# Patient Record
Sex: MALE | Race: BLACK OR AFRICAN AMERICAN
[De-identification: names, ages, dates, MRNs, and addresses within clinical notes are randomized per-mention and may not be internally consistent; named-entity substitution may affect disease eponyms.]

---

## 2020-02-15 ENCOUNTER — HOSPITAL ENCOUNTER (EMERGENCY)
Dept: HOSPITAL 61 - ER | Age: 70
LOS: 1 days | Discharge: HOME | End: 2020-02-16
Payer: OTHER GOVERNMENT

## 2020-02-15 VITALS — DIASTOLIC BLOOD PRESSURE: 87 MMHG | SYSTOLIC BLOOD PRESSURE: 152 MMHG

## 2020-02-15 VITALS — WEIGHT: 119.27 LBS | BODY MASS INDEX: 18.08 KG/M2 | HEIGHT: 68 IN

## 2020-02-15 DIAGNOSIS — E11.9: ICD-10-CM

## 2020-02-15 DIAGNOSIS — I10: ICD-10-CM
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
## 2020-02-15 DIAGNOSIS — F10.229: Primary | ICD-10-CM

## 2020-02-15 LAB
ALBUMIN SERPL-MCNC: 3.7 G/DL (ref 3.4–5)
ALBUMIN/GLOB SERPL: 0.8 {RATIO} (ref 1–1.7)
ALP SERPL-CCNC: 100 U/L (ref 46–116)
ALT SERPL-CCNC: 17 U/L (ref 16–63)
AMPHETAMINE/METHAMPHETAMINE: (no result)
ANION GAP SERPL CALC-SCNC: 14 MMOL/L (ref 6–14)
APTT PPP: YELLOW S
AST SERPL-CCNC: 27 U/L (ref 15–37)
BACTERIA #/AREA URNS HPF: 0 /HPF
BARBITURATES UR-MCNC: (no result) UG/ML
BASOPHILS # BLD AUTO: 0 X10^3/UL (ref 0–0.2)
BASOPHILS NFR BLD: 1 % (ref 0–3)
BENZODIAZ UR-MCNC: (no result) UG/L
BILIRUB SERPL-MCNC: 0.2 MG/DL (ref 0.2–1)
BILIRUB UR QL STRIP: NEGATIVE
BUN SERPL-MCNC: 8 MG/DL (ref 8–26)
BUN/CREAT SERPL: 10 (ref 6–20)
CALCIUM SERPL-MCNC: 9 MG/DL (ref 8.5–10.1)
CANNABINOIDS UR-MCNC: (no result) UG/L
CHLORIDE SERPL-SCNC: 102 MMOL/L (ref 98–107)
CO2 SERPL-SCNC: 24 MMOL/L (ref 21–32)
COCAINE UR-MCNC: (no result) NG/ML
CREAT SERPL-MCNC: 0.8 MG/DL (ref 0.7–1.3)
EOSINOPHIL NFR BLD: 0 X10^3/UL (ref 0–0.7)
EOSINOPHIL NFR BLD: 1 % (ref 0–3)
ERYTHROCYTE [DISTWIDTH] IN BLOOD BY AUTOMATED COUNT: 17.4 % (ref 11.5–14.5)
FIBRINOGEN PPP-MCNC: CLEAR MG/DL
GFR SERPLBLD BASED ON 1.73 SQ M-ARVRAT: 116.3 ML/MIN
GLOBULIN SER-MCNC: 4.4 G/DL (ref 2.2–3.8)
GLUCOSE SERPL-MCNC: 88 MG/DL (ref 70–99)
HCT VFR BLD CALC: 39.9 % (ref 39–53)
HGB BLD-MCNC: 12.6 G/DL (ref 13–17.5)
LIPASE: 256 U/L (ref 73–393)
LYMPHOCYTES # BLD: 1.3 X10^3/UL (ref 1–4.8)
LYMPHOCYTES NFR BLD AUTO: 34 % (ref 24–48)
MCH RBC QN AUTO: 26 PG (ref 25–35)
MCHC RBC AUTO-ENTMCNC: 32 G/DL (ref 31–37)
MCV RBC AUTO: 82 FL (ref 79–100)
METHADONE SERPL-MCNC: (no result) NG/ML
MONO #: 0.3 X10^3/UL (ref 0–1.1)
MONOCYTES NFR BLD: 9 % (ref 0–9)
NEUT #: 2.2 X10^3/UL (ref 1.8–7.7)
NEUTROPHILS NFR BLD AUTO: 56 % (ref 31–73)
NITRITE UR QL STRIP: NEGATIVE
OPIATES UR-MCNC: (no result) NG/ML
PCP SERPL-MCNC: (no result) MG/DL
PH UR STRIP: 6 [PH]
PLATELET # BLD AUTO: 240 X10^3/UL (ref 140–400)
POTASSIUM SERPL-SCNC: 3.3 MMOL/L (ref 3.5–5.1)
PROT SERPL-MCNC: 8.1 G/DL (ref 6.4–8.2)
PROT UR STRIP-MCNC: NEGATIVE MG/DL
RBC # BLD AUTO: 4.89 X10^6/UL (ref 4.3–5.7)
RBC #/AREA URNS HPF: 0 /HPF (ref 0–2)
SODIUM SERPL-SCNC: 140 MMOL/L (ref 136–145)
UROBILINOGEN UR-MCNC: 0.2 MG/DL
WBC # BLD AUTO: 4 X10^3/UL (ref 4–11)
WBC #/AREA URNS HPF: 0 /HPF (ref 0–4)

## 2020-02-15 PROCEDURE — 81001 URINALYSIS AUTO W/SCOPE: CPT

## 2020-02-15 PROCEDURE — 83690 ASSAY OF LIPASE: CPT

## 2020-02-15 PROCEDURE — 80053 COMPREHEN METABOLIC PANEL: CPT

## 2020-02-15 PROCEDURE — 99283 EMERGENCY DEPT VISIT LOW MDM: CPT

## 2020-02-15 PROCEDURE — 80307 DRUG TEST PRSMV CHEM ANLYZR: CPT

## 2020-02-15 PROCEDURE — 85025 COMPLETE CBC W/AUTO DIFF WBC: CPT

## 2020-02-15 PROCEDURE — 96365 THER/PROPH/DIAG IV INF INIT: CPT

## 2020-02-15 PROCEDURE — 36415 COLL VENOUS BLD VENIPUNCTURE: CPT

## 2020-02-15 PROCEDURE — 96366 THER/PROPH/DIAG IV INF ADDON: CPT

## 2020-02-15 NOTE — PHYS DOC
Past Medical History


Past Medical History:  Diabetes-Type II, Hypertension


 (ASHVIN BRANDON)


Past Surgical History:  Other


Additional Past Surgical Histo:  unknown


 (ASHVIN BRANDON)


Smoking Status:  Unknown if ever smoked


Alcohol Use:  Heavy


Drug Use:  None


 (ASHVIN BRANDON)


Attending Signature


I have participated in the care of this patient and I have reviewed and agree 

with all pertinent clinical information above including history, exam, and 

recommendations.





 (KIMBERLY STEPHENSON MD)





Adult General


Chief Complaint


Chief Complaint:  ALCOHOL INTOXICATION





HPI


HPI





Patient is a 69  year old AA male who presents to the emergency department today

via EMS with reports of alcohol intoxication. EMS reports that the patient was 

at [facility] when he urinated himself and an ambulance was called. Patient refuses

to answer any questions he denies any pain or complaints at this time, limited 

history of present illness due the patient's intoxicated status.


 (ASHVIN BRANDON)





Review of Systems


Review of Systems


All other ROS is negative unless otherwise noted in HPI.


 (ASHVIN BRANDON)





Current Medications


Current Medications





Current Medications








 Medications


  (Trade)  Dose


 Ordered  Sig/Hiwot  Start Time


 Stop Time Status Last Admin


Dose Admin


 


 Multivitamins 10


 ml/Thiamine HCl


 100 mg/Folic Acid


 1 mg/Sodium


 Chloride  1,011.2 ml


  @ 1,000.088


 mls/hr  1X  ONCE  2/15/20 18:15


 2/15/20 19:15 DC 2/15/20 18:57


1,000.088 MLS/HR





 (KIMBERLY STEPHENSON MD)





Allergies


Allergies





Allergies








Coded Allergies Type Severity Reaction Last Updated Verified


 


  No Known Drug Allergies    6/22/19 No





 (KIMBERLY STEPHENSON MD)





Physical Exam


Physical Exam


See Above


Constitutional: Well developed, well nourished, no acute distress, intoxicated 

appearance, strong odor of alcohol and urine


HENT: Normocephalic, atraumatic, bilateral external ears normal, nose normal. []


Eyes: PERRLA, EOMI, conjunctiva normal, no discharge. [] 


Neck: Normal range of motion, no stridor. [] 


Cardiovascular:Heart rate regular rhythm


Lungs & Thorax:  Respirations even and unlabored, no retractions, no respiratory

 distress


Skin: Warm, dry, no erythema, no rash. 


Extremities: No cyanosis, ROM intact


Neurologic: Alert and oriented X 3, no focal deficits noted. []


Psychologic: Affect agitated, judgement impaired, mood intoxicated


 (ASHVIN BRANDON)





Current Patient Data


Vital Signs





                                   Vital Signs








  Date Time  Temp Pulse Resp B/P (MAP) Pulse Ox O2 Delivery O2 Flow Rate FiO2


 


2/15/20 23:41  86  152/87 (108) 97 Room Air  


 


2/15/20 17:11 98.3  18     





 98.3       





 (KIMBERLY STEPHENSON MD)


Lab Values





                                Laboratory Tests








Test


 2/15/20


17:13 2/15/20


17:48


 


Urine Collection Type Unknown   


 


Urine Color Yellow   


 


Urine Clarity Clear   


 


Urine pH 6.0   


 


Urine Specific Gravity <=1.005   


 


Urine Protein


 Negative mg/dL


(NEG-TRACE) 





 


Urine Glucose (UA)


 Negative mg/dL


(NEG) 





 


Urine Ketones (Stick)


 Negative mg/dL


(NEG) 





 


Urine Blood


 Negative (NEG)


 





 


Urine Nitrite


 Negative (NEG)


 





 


Urine Bilirubin


 Negative (NEG)


 





 


Urine Urobilinogen Dipstick


 0.2 mg/dL (0.2


mg/dL) 





 


Urine Leukocyte Esterase


 Negative (NEG)


 





 


Urine RBC 0 /HPF (0-2)   


 


Urine WBC 0 /HPF (0-4)   


 


Urine Bacteria


 0 /HPF (0-FEW)


 





 


Urine Opiates Screen Neg (NEG)   


 


Urine Methadone Screen Neg (NEG)   


 


Urine Barbiturates Neg (NEG)   


 


Urine Phencyclidine Screen Neg (NEG)   


 


Urine


Amphetamine/Methamphetamine Neg (NEG)  


 





 


Urine Benzodiazepines Screen Neg (NEG)   


 


Urine Cocaine Screen Neg (NEG)   


 


Urine Cannabinoids Screen Neg (NEG)   


 


Urine Ethyl Alcohol Pos (NEG)   


 


White Blood Count


 


 4.0 x10^3/uL


(4.0-11.0)


 


Red Blood Count


 


 4.89 x10^6/uL


(4.30-5.70)


 


Hemoglobin


 


 12.6 g/dL


(13.0-17.5)  L


 


Hematocrit


 


 39.9 %


(39.0-53.0)


 


Mean Corpuscular Volume


 


 82 fL ()





 


Mean Corpuscular Hemoglobin  26 pg (25-35)  


 


Mean Corpuscular Hemoglobin


Concent 


 32 g/dL


(31-37)


 


Red Cell Distribution Width


 


 17.4 %


(11.5-14.5)  H


 


Platelet Count


 


 240 x10^3/uL


(140-400)


 


Neutrophils (%) (Auto)  56 % (31-73)  


 


Lymphocytes (%) (Auto)  34 % (24-48)  


 


Monocytes (%) (Auto)  9 % (0-9)  


 


Eosinophils (%) (Auto)  1 % (0-3)  


 


Basophils (%) (Auto)  1 % (0-3)  


 


Neutrophils # (Auto)


 


 2.2 x10^3/uL


(1.8-7.7)


 


Lymphocytes # (Auto)


 


 1.3 x10^3/uL


(1.0-4.8)


 


Monocytes # (Auto)


 


 0.3 x10^3/uL


(0.0-1.1)


 


Eosinophils # (Auto)


 


 0.0 x10^3/uL


(0.0-0.7)


 


Basophils # (Auto)


 


 0.0 x10^3/uL


(0.0-0.2)


 


Sodium Level


 


 140 mmol/L


(136-145)


 


Potassium Level


 


 3.3 mmol/L


(3.5-5.1)  L


 


Chloride Level


 


 102 mmol/L


()


 


Carbon Dioxide Level


 


 24 mmol/L


(21-32)


 


Anion Gap  14 (6-14)  


 


Blood Urea Nitrogen


 


 8 mg/dL (8-26)





 


Creatinine


 


 0.8 mg/dL


(0.7-1.3)


 


Estimated GFR


(Cockcroft-Gault) 


 116.3  





 


BUN/Creatinine Ratio  10 (6-20)  


 


Glucose Level


 


 88 mg/dL


(70-99)


 


Calcium Level


 


 9.0 mg/dL


(8.5-10.1)


 


Total Bilirubin


 


 0.2 mg/dL


(0.2-1.0)


 


Aspartate Amino Transferase


(AST) 


 27 U/L (15-37)





 


Alanine Aminotransferase (ALT)


 


 17 U/L (16-63)





 


Alkaline Phosphatase


 


 100 U/L


()


 


Total Protein


 


 8.1 g/dL


(6.4-8.2)


 


Albumin


 


 3.7 g/dL


(3.4-5.0)


 


Albumin/Globulin Ratio


 


 0.8 (1.0-1.7)


L


 


Lipase


 


 256 U/L


()


 


Ethyl Alcohol Level


 


 277 mg/dL


(0-10)  H





                                Laboratory Tests


2/15/20 17:48








                                Laboratory Tests


2/15/20 17:48








 (KIMBERLY STEPHENSON MD)





EKG


EKG


[]


 (ASHVIN BRANDON)





Radiology/Procedures


Radiology/Procedures


[]


 (ASHVIN BRANDON)





Course & Med Decision Making


Course & Med Decision Making


Pertinent Labs and Imaging studies reviewed. (See chart for details)


Patient is a 69-year-old AA male who was sent to the emergency department after 

being found in [facility] incontinent of urine and intoxicated in public. 


CBC is unremarkable, CMP revealed a potassium of 3.3, otherwise unremarkable; 

UDS is positive for urinary for alcohol otherwise negative; alcohol level is 

277; UA is unremarkable.


The patient was given a banana bag IV while in the emergency department. After 

sleeping for several hours patient was able to ambulate unassisted with a steady

 gait and was clinically sober. Patient was able to provide an addressed to the 

nurse and a cab voucher was provided for the patient to go home.





[]


 (ASHVIN BRANDON)





Dragon Disclaimer


Dragon Disclaimer


This electronic medical record was generated, in whole or in part, using a voice

 recognition dictation system.


 (ASHVIN BRANDON)





Departure


Departure


Impression:  


   Primary Impression:  


   Alcohol intoxication


Disposition:  01 HOME, SELF-CARE


Condition:  STABLE


Referrals:  


NO PCP (PCP)


Patient Instructions:  Alcohol Intoxication, Easy-to-Read





Additional Instructions:  


STOP DRINKING ALCOHOL. Follow up with your primary care doctor as needed. We 

have provided a local resource list if you decide that you would like to receive

 help for alcohol abuse.


Scripts


No Active Prescriptions or Reported Meds





Problem Qualifiers








   Primary Impression:  


   Alcohol intoxication


   Complication of substance-induced condition:  uncomplicated  Qualified Codes:

     F10.920 - Alcohol use, unspecified with intoxication, uncomplicated








ASHVIN BRANDON       Feb 15, 2020 23:55


KIMBERLY STEPHENSON MD              Feb 16, 2020 01:48

## 2020-05-29 ENCOUNTER — HOSPITAL ENCOUNTER (EMERGENCY)
Dept: HOSPITAL 61 - ER | Age: 70
Discharge: HOME | End: 2020-05-29
Payer: SELF-PAY

## 2020-05-29 VITALS — WEIGHT: 170.2 LBS | BODY MASS INDEX: 24.37 KG/M2 | HEIGHT: 70 IN

## 2020-05-29 VITALS — DIASTOLIC BLOOD PRESSURE: 86 MMHG | SYSTOLIC BLOOD PRESSURE: 138 MMHG

## 2020-05-29 DIAGNOSIS — Z87.891: ICD-10-CM

## 2020-05-29 DIAGNOSIS — F10.229: Primary | ICD-10-CM

## 2020-05-29 DIAGNOSIS — E11.9: ICD-10-CM

## 2020-05-29 DIAGNOSIS — R10.13: ICD-10-CM

## 2020-05-29 DIAGNOSIS — I10: ICD-10-CM

## 2020-05-29 PROCEDURE — 70450 CT HEAD/BRAIN W/O DYE: CPT

## 2020-05-29 PROCEDURE — 99284 EMERGENCY DEPT VISIT MOD MDM: CPT

## 2020-05-29 NOTE — PHYS DOC
Past Medical History


Past Medical History:  Diabetes-Type II, Hypertension


Past Surgical History:  Other


Additional Past Surgical Histo:  unknown


Smoking Status:  Former Smoker


Alcohol Use:  Heavy


Drug Use:  None





General Adult


EDM:


Chief Complaint:  MECHANICAL FALL





HPI:


HPI:





Patient is a 70  year old male presenting to the ER with a chief complaint of 

alcohol intoxication and possible fall.  EMS state that patient was found on his

back on the sidewalk.  Patient had an empty bottle of alcohol


.  Currently patient complains of epigastric abdominal tenderness.  Patient 

denies any other pain or injury.  Patient denies taking blood thinners.  Patient

is able to move all 4 extremities.





Review of Systems:


Review of Systems:


Constitutional:  Denies fever or chills. []


Eyes:  Denies change in visual acuity. []


HENT:  Denies nasal congestion or sore throat. [] 


Respiratory:  Denies cough or shortness of breath. [] 


Cardiovascular:  Denies chest pain or edema. [] 


GI: Complains of epigastric abdominal tenderness


:  Denies dysuria. [] 


Neurologic:  Denies headache, focal weakness or sensory changes. []





Heart Score:


Risk Factors:


Risk Factors:  DM, Current or recent (<one month) smoker, HTN, HLP, family h

istory of CAD, obesity.


Risk Scores:


Score 0 - 3:  2.5% MACE over next 6 weeks - Discharge Home


Score 4 - 6:  20.3% MACE over next 6 weeks - Admit for Clinical Observation


Score 7 - 10:  72.7% MACE over next 6 weeks - Early Invasive Strategies





Current Medications:





Current Medications








 Medications


  (Trade)  Dose


 Ordered  Sig/Hiwot  Start Time


 Stop Time Status Last Admin


Dose Admin


 


 Famotidine


  (Pepcid)  20 mg  1X  ONCE  5/29/20 16:00


 5/29/20 16:01 DC 5/29/20 15:56


20 MG











Allergies:


Allergies:





Allergies








Coded Allergies Type Severity Reaction Last Updated Verified


 


  No Known Drug Allergies    6/22/19 No











Physical Exam:


PE:





Constitutional: Well developed, well nourished, no acute distress, non-toxic 

appearance. []


HENT: Normocephalic, atraumatic


Eyes: PERRLA, EOMI


Neck: Normal range of motion, no tenderness, supple


Cardiovascular:Heart rate regular rhythm


Lungs & Thorax:  Bilateral breath sounds clear to auscultation []


Abdomen: Bowel sounds normal, soft, no tenderness


Back: No tenderness, no CVA tenderness. [] 


Extremities: No tenderness, no cyanosis, no clubbing, ROM intact, no edema. [] 


Neurologic: Alert and oriented X 3





Current Patient Data:


Vital Signs:





                                   Vital Signs








  Date Time  Temp Pulse Resp B/P (MAP) Pulse Ox O2 Delivery O2 Flow Rate FiO2


 


5/29/20 15:51 98.2 84 18 138/86 (103) 96 Room Air  





 98.2       











EKG:


EKG:


[]





Radiology/Procedures:


Radiology/Procedures:


[]


Impression:


CT head shows no acute disease





Course & Med Decision Making:


Course & Med Decision Making


Pertinent Imaging studies reviewed. (See chart for details)





Ordered CT head and oral tablet of Pepcid.





No other obvious signs of injury seen.


Patient does not give reliable history.





CT head negative for acute disease.





Patient is sleeping comfortably in the ER.





Patient will be discharged when clinically sober.





Discussed  results and plan of care with patient.


Patient is instructed to follow up with PCP in one to 2 days.


Appropriate discharge instructions given to patient to return to the ED or to 

seek immediate medical evaluation.


Patient is instructed to return to the ED if symptoms worsen or if any concerns.





Dragon Disclaimer:


Dragon Disclaimer:


This electronic medical record was generated, in whole or in part, using a voice

 recognition dictation system.





Departure


Departure


Impression:  


   Primary Impression:  


   Alcohol intoxication


Disposition:  01 HOME, SELF-CARE


Condition:  IMPROVED


Referrals:  


NO PCP (PCP)


Patient Instructions:  Alcohol Intoxication





Additional Instructions:  


Please return to the ED if symptoms worsen or if any concerns.


Please follow-up with PCP in 1 to 2 days.











ARTURO BRYAN DO           May 29, 2020 16:24

## 2020-05-29 NOTE — RAD
EXAM: Head CT without contrast.

 

HISTORY: Fall.

 

TECHNIQUE: Computed tomographic images of the head were obtained without 

contrast.

 

*One or more of the following individualized dose reduction techniques 

were utilized for this examination:  

1. Automated exposure control.  

2. Adjustment of the mA and/or kV according to patient size.  

3. Use of iterative reconstruction technique.

 

COMPARISON: 12/6/2018.

 

FINDINGS: There is no acute or subacute extra-axial or intraparenchymal 

hemorrhage. There is no mass effect or midline shift. There is no 

hydrocephalus. 

 

There is cerebral atrophy. There is extensive decreased attenuation within

the cerebral white matter, likely due to chronic small vessel disease. 

There are suspected chronic infarcts within the right frontal and parietal

lobes.

 

There are fracture deformities of the bilateral nasal bones, of uncertain 

chronicity. There is mild paranasal sinus mucosal thickening and there is 

a left maxillary sinus mucous retention cyst. There are bilateral david 

bullosa. The mastoid air cells are clear.

 

IMPRESSION:

1. No acute intracranial finding.

2. Bilateral cerebral white matter changes, likely due to advanced small 

vessel disease.

3. Suspected small chronic infarcts within the right frontal and parietal 

lobes.

4. Cerebral volume loss.

5. Bilateral nasal bone fractures of uncertain chronicity.

 

Electronically signed by: Yaneth Sloan MD (5/29/2020 4:40 PM) [id number]

## 2021-01-26 ENCOUNTER — HOSPITAL ENCOUNTER (EMERGENCY)
Dept: HOSPITAL 61 - ER | Age: 71
Discharge: HOME | End: 2021-01-26
Payer: SELF-PAY

## 2021-01-26 VITALS — HEIGHT: 65 IN | BODY MASS INDEX: 20.02 KG/M2 | WEIGHT: 120.15 LBS

## 2021-01-26 VITALS — DIASTOLIC BLOOD PRESSURE: 97 MMHG | SYSTOLIC BLOOD PRESSURE: 166 MMHG
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
## 2021-01-26 DIAGNOSIS — I10: ICD-10-CM

## 2021-01-26 DIAGNOSIS — M54.5: ICD-10-CM

## 2021-01-26 DIAGNOSIS — E11.9: ICD-10-CM

## 2021-01-26 DIAGNOSIS — F10.20: Primary | ICD-10-CM

## 2021-01-26 DIAGNOSIS — Z87.891: ICD-10-CM

## 2021-01-26 PROCEDURE — 99284 EMERGENCY DEPT VISIT MOD MDM: CPT

## 2021-01-26 NOTE — PHYS DOC
Past Medical History


Past Medical History:  Diabetes-Type II, Hypertension


Past Surgical History:  Other


Additional Past Surgical Histo:  unknown


Smoking Status:  Former Smoker


Alcohol Use:  Heavy


Drug Use:  None





Adult General


Chief Complaint


Chief Complaint:  ALCOHOL INTOXICATION





HPI


HPI





Patient is a 70  year old male with a known past medical history of alcohol 

abuse and dependence now presents emergency department complaining of back pain.

 Patient initially stated to nursing staff that he was having right lower back 

pain.  However when I went to evaluate him he states that he does not want to be

evaluated and simply wants to sleep.  Patient is a known history of alcohol use 

and states that he drank a little bit tonight but does not state how much.  

Patient refusing to provide any further history.





Review of Systems


Review of Systems





Constitutional: Denies fever or chills []


Eyes: Denies change in visual acuity, redness, or eye pain []


HENT: Denies nasal congestion or sore throat []


Respiratory: Denies cough or shortness of breath []


Cardiovascular: No additional information not addressed in HPI []


GI: Denies abdominal pain, nausea, vomiting, bloody stools or diarrhea []


:  Denies dysuria or hematuria []


Musculoskeletal: Denies back pain or joint pain []


Integument: Denies rash or skin lesions []


Neurologic: Denies headache, focal weakness or sensory changes []


Endocrine: Denies polyuria or polydipsia []





All other systems were reviewed and found to be within normal limits, except as 

documented in this note.





Current Medications


Current Medications





Current Medications








 Medications


  (Trade)  Dose


 Ordered  Sig/Hiwot  Start Time


 Stop Time Status Last Admin


Dose Admin


 


 Ibuprofen


  (Motrin)  800 mg  1X  ONCE  1/26/21 19:15


 1/26/21 19:16 DC  














Allergies


Allergies





Allergies








Coded Allergies Type Severity Reaction Last Updated Verified


 


  No Known Drug Allergies    6/22/19 No











Physical Exam


Physical Exam





Constitutional: Well developed, well nourished, no acute distress, non-toxic 

appearance. []


HENT: Normocephalic, atraumatic, bilateral external ears normal, oropharynx 

moist, no oral exudates, nose normal. []


Eyes: PERRLA, EOMI, conjunctiva normal, no discharge. [] 


Neck: Normal range of motion, no tenderness, supple, no stridor. [] 


Cardiovascular:Heart rate regular rhythm, no murmur []


Lungs & Thorax:  Bilateral breath sounds clear to auscultation []


Abdomen: Bowel sounds normal, soft, no tenderness, no masses, no pulsatile 

masses. [] 


Skin: Warm, dry, no erythema, no rash. [] 


Back: No tenderness, no CVA tenderness. [] 


Extremities: No tenderness, no cyanosis, no clubbing, ROM intact, no edema. [] 


Neurologic: Alert and oriented X 3, normal motor function, normal sensory 

function, no focal deficits noted. []


Psychologic: Affect normal, judgement normal, mood normal. []





EKG


EKG


[]





Radiology/Procedures


Radiology/Procedures


[]





Course & Med Decision Making


Course & Med Decision Making


Pertinent Labs and Imaging studies reviewed. (See chart for details)





70-year-old male initially presented complaining of back pain but refusing any 

work-up, physical exam or further evaluation.  Patient appears alert and 

oriented x4, ambulates at his baseline and appears to have decision-making cap

acity.  Patient refused any further work-up or blood work.  At this time will 

discharge home.  We have provided the patient with information regarding safe 

place to stay and sleep as the patient is homeless





Dragon Disclaimer


Dragon Disclaimer


This electronic medical record was generated, in whole or in part, using a voice

 recognition dictation system.





Departure


Departure


Impression:  


   Primary Impression:  


   ETOH abuse


Disposition:  01 DC HOME SELF CARE/HOMELESS


Referrals:  


NO PCP (PCP)


Patient Instructions:  Alcohol Intoxication, Alcohol Problems





Additional Instructions:  


EMERGENCY DEPARTMENT GENERAL DISCHARGE INSTRUCTIONS





Thank you for coming to [facility] Emergency Department (ED) [facility] and 


trusting us with you care.  We trust that you had a positive experience in our 

Emergency 


Department.  If you wish to speak to the department management, you may call the

 Director at 


[phone].





YOUR FOLLOW UP INSTRUCTIONS ARE AS FOLLOWS:





1.  Do you have a private Doctor?  If you do not have a private doctor, please 

ask for a 


resource list of physicians or clinics that may be able to assist you with 

follow up care.





2.  The Emergency Physicain has interpreted your x-rays.  The X-Ray specialist 

will also 


review them.  If there is a change in the findings, you will be notified in 48 

hours when at 


all possible.





3.  A lab test or culture has been done, your results will be reviewed and you 

will be 


notified if you need a change in treatment.





ADDITIONAL INSTRUCTIONS AND INFORMATION:





1.  Your care today has been supervised by a physician who is specially trained 

in emergency 


care.  Many problems require more than one evaluation for a complete diagnosis 

and 


treatment.  We recommend that you schedule your follow up appointment as 

recommended to 


ensure complete treatment of you illness or injury.  If you are unable to obtain

 follow up 


care and continue to have a problem, or if your condition worsens, we recommend 

that you 


return to the ED.





2.  We are not able to safely determine your condition over the phone nor are we

 able to 


give sound medical advice over the phone.  For these safety reasons, if you call

 for medical 


advice we will ask you to come to the ED for further evaluation.





3.  If you have any questions regarding these discharge instructions please call

 the ED at 


[phone].





SAFETY INFORMATION:





In the interest of safety, wellness, and injury prevention; we encourage you to 

wear your 


sealbelt, if you smoke; quite smoking, and we encourage family to use a 

protective helmet 


for bicycling and other sporting events that present an increased risk for head 

injury.





IF YOUR SYMPTOMS WORSEN OR NEW SYMPTOMS DEVELOP, OR YOU HAVE CONCERNS ABOUT YOUR

 CONDITION; 


OR IF YOUR CONDITION WORSENS WHILE YOU ARE WAITING FOR YOUR FOLLOW UP 

APPOINTMENT; EITHER 


CONTACT YOUR PRIMARY CARE DOCTOR, THE PHYSICIAN WHOSE NAME AND NUMBER YOU WERE 

GIVEN, OR 


RETURN TO THE ED IMMEDIATELY.











ARSLAN MUÑOZ MD              Jan 26, 2021 20:40